# Patient Record
Sex: MALE | Race: WHITE
[De-identification: names, ages, dates, MRNs, and addresses within clinical notes are randomized per-mention and may not be internally consistent; named-entity substitution may affect disease eponyms.]

---

## 2018-08-27 ENCOUNTER — HOSPITAL ENCOUNTER (EMERGENCY)
Dept: HOSPITAL 61 - ER | Age: 63
Discharge: ANOTHER HOSPITAL | End: 2018-08-27
Payer: SELF-PAY

## 2018-08-27 VITALS — BODY MASS INDEX: 26.98 KG/M2 | WEIGHT: 158 LBS | HEIGHT: 64 IN

## 2018-08-27 VITALS — DIASTOLIC BLOOD PRESSURE: 99 MMHG | SYSTOLIC BLOOD PRESSURE: 196 MMHG

## 2018-08-27 DIAGNOSIS — R47.81: ICD-10-CM

## 2018-08-27 DIAGNOSIS — H54.62: ICD-10-CM

## 2018-08-27 DIAGNOSIS — R29.810: Primary | ICD-10-CM

## 2018-08-27 LAB
ANION GAP SERPL CALC-SCNC: 8 MMOL/L (ref 6–14)
APTT BLD: 27 SEC (ref 24–38)
BASOPHILS # BLD AUTO: 0.1 X10^3/UL (ref 0–0.2)
BASOPHILS NFR BLD: 1 % (ref 0–3)
BUN SERPL-MCNC: 17 MG/DL (ref 8–26)
CALCIUM SERPL-MCNC: 8.9 MG/DL (ref 8.5–10.1)
CHLORIDE SERPL-SCNC: 107 MMOL/L (ref 98–107)
CO2 SERPL-SCNC: 24 MMOL/L (ref 21–32)
CREAT SERPL-MCNC: 1.3 MG/DL (ref 0.7–1.3)
EOSINOPHIL NFR BLD: 0.1 X10^3/UL (ref 0–0.7)
EOSINOPHIL NFR BLD: 1 % (ref 0–3)
ERYTHROCYTE [DISTWIDTH] IN BLOOD BY AUTOMATED COUNT: 14.2 % (ref 11.5–14.5)
GFR SERPLBLD BASED ON 1.73 SQ M-ARVRAT: 56.1 ML/MIN
GLUCOSE SERPL-MCNC: 115 MG/DL (ref 70–99)
HCT VFR BLD CALC: 44.7 % (ref 39–53)
HGB BLD-MCNC: 15.1 G/DL (ref 13–17.5)
LYMPHOCYTES # BLD: 1.6 X10^3/UL (ref 1–4.8)
LYMPHOCYTES NFR BLD AUTO: 12 % (ref 24–48)
MCH RBC QN AUTO: 31 PG (ref 25–35)
MCHC RBC AUTO-ENTMCNC: 34 G/DL (ref 31–37)
MCV RBC AUTO: 92 FL (ref 79–100)
MONO #: 0.5 X10^3/UL (ref 0–1.1)
MONOCYTES NFR BLD: 4 % (ref 0–9)
NEUT #: 10.5 X10^3UL (ref 1.8–7.7)
NEUTROPHILS NFR BLD AUTO: 82 % (ref 31–73)
PLATELET # BLD AUTO: 265 X10^3/UL (ref 140–400)
POTASSIUM SERPL-SCNC: 3.7 MMOL/L (ref 3.5–5.1)
PROTHROMBIN TIME: 13 SEC (ref 11.7–14)
RBC # BLD AUTO: 4.86 X10^6/UL (ref 4.3–5.7)
SODIUM SERPL-SCNC: 139 MMOL/L (ref 136–145)
WBC # BLD AUTO: 12.8 X10^3/UL (ref 4–11)

## 2018-08-27 PROCEDURE — 84484 ASSAY OF TROPONIN QUANT: CPT

## 2018-08-27 PROCEDURE — 70498 CT ANGIOGRAPHY NECK: CPT

## 2018-08-27 PROCEDURE — 96374 THER/PROPH/DIAG INJ IV PUSH: CPT

## 2018-08-27 PROCEDURE — 80048 BASIC METABOLIC PNL TOTAL CA: CPT

## 2018-08-27 PROCEDURE — 85730 THROMBOPLASTIN TIME PARTIAL: CPT

## 2018-08-27 PROCEDURE — 70450 CT HEAD/BRAIN W/O DYE: CPT

## 2018-08-27 PROCEDURE — 93005 ELECTROCARDIOGRAM TRACING: CPT

## 2018-08-27 PROCEDURE — 99285 EMERGENCY DEPT VISIT HI MDM: CPT

## 2018-08-27 PROCEDURE — 70496 CT ANGIOGRAPHY HEAD: CPT

## 2018-08-27 PROCEDURE — 36415 COLL VENOUS BLD VENIPUNCTURE: CPT

## 2018-08-27 PROCEDURE — 85025 COMPLETE CBC W/AUTO DIFF WBC: CPT

## 2018-08-27 PROCEDURE — 85610 PROTHROMBIN TIME: CPT

## 2018-08-27 NOTE — PHYS DOC
Adult General


HPI


HPI





Patient is a 63  year old male who presents with left side facial droop and 

left side weakness.  Patient is a .  His truck was being unloaded 

and he fell asleep in the cab. He awoke about 2 hours prior to presentation 

with left-sided weakness and slurred speech he. He also had some loss of vision 

in the left visual fields in the left eye. His last known normal was around 

noon today. The patient has no stroke history. He does have prior history of 

open-heart surgery. He has multiple stents in place. He states he does take 

some sort of anticoagulant medications but does not know what they are and he 

does not have them with him. Denies chest pain. He was at baseline health prior 

to onset of symptoms today.





Review of Systems


Review of Systems





Constitutional: Denies fever or chills 


Eyes: Denies change in visual acuity


HENT: Denies nasal congestion 


Respiratory: Denies cough or shortness of breath


Cardiovascular: No additional information not addressed in HPI 


GI: Denies abdominal pain


: Denies dysuria


Musculoskeletal: Denies back pain or joint pain


Integument: Denies rash or skin lesions 


Neurologic: Denies headache


Endocrine: Denies polyuria





All other systems were reviewed and found to be within normal limits, except as 

documented in this note.





Current Medications


Current Medications





Current Medications








 Medications


  (Trade)  Dose


 Ordered  Sig/Fernando  Start Time


 Stop Time Status Last Admin


Dose Admin


 


 Aspirin


  (Aspirin)  300 mg  1X  ONCE  8/27/18 18:15


 8/27/18 19:03 DC 8/27/18 20:04


300 MG


 


 Info


  (CONTRAST GIVEN


 -- Rx MONITORING)  1 each  PRN DAILY  PRN  8/27/18 19:15


 8/29/18 19:14   





 


 Iohexol


  (Omnipaque 300


 Mg/ml)  75 ml  1X  ONCE  8/27/18 18:30


 8/27/18 19:03 DC 8/27/18 18:30


60 ML


 


 Lorazepam


  (Ativan)  0.5 mg  1X  ONCE  8/27/18 18:30


 8/27/18 19:03 DC 8/27/18 19:35


0.5 MG


 


 Ondansetron HCl


  (Zofran)  4 mg  PRN Q8HRS  PRN  8/27/18 19:30


 8/28/18 19:29   





 


 Sodium Chloride  1,000 ml @ 


 75 mls/hr  [id number]  8/27/18 19:28


 8/28/18 19:27   














Allergies


Allergies





Allergies








Coded Allergies Type Severity Reaction Last Updated Verified


 


  No Known Drug Allergies    8/27/18 No











Physical Exam


Physical Exam





Constitutional: Well developed, well nourished, no acute distress, non-toxic 

appearance


HENT: Normocephalic, atraumatic, bilateral external ears normal, oropharynx 

moist


Eyes: PERRLA, EOMI, conjunctiva normal 


Neck: Normal range of motion, no tenderness 


Cardiovascular:Heart rate regular rhythm, no murmur


Lungs & Thorax:  Bilateral breath sounds clear to auscultation


Abdomen: Bowel sounds normal, soft, no tenderness 


Skin: Warm, dry, no erythema  


Extremities: No tenderness, no edema 


Neurologic: Alert and oriented X 3, motor weakness in left upper and lower 

extremities.  Left side facial droop.  Slurred speech.  Loss of vision over 

temporal visual fields in the left eye.


Psychologic: Affect normal





Current Patient Data


Lab Values





 Laboratory Tests








Test


 8/27/18


18:10


 


White Blood Count


 12.8 x10^3/uL


(4.0-11.0)  H


 


Red Blood Count


 4.86 x10^6/uL


(4.30-5.70)


 


Hemoglobin


 15.1 g/dL


(13.0-17.5)


 


Hematocrit


 44.7 %


(39.0-53.0)


 


Mean Corpuscular Volume


 92 fL ()





 


Mean Corpuscular Hemoglobin 31 pg (25-35)  


 


Mean Corpuscular Hemoglobin


Concent 34 g/dL


(31-37)


 


Red Cell Distribution Width


 14.2 %


(11.5-14.5)


 


Platelet Count


 265 x10^3/uL


(140-400)


 


Neutrophils (%) (Auto) 82 % (31-73)  H


 


Lymphocytes (%) (Auto) 12 % (24-48)  L


 


Monocytes (%) (Auto) 4 % (0-9)  


 


Eosinophils (%) (Auto) 1 % (0-3)  


 


Basophils (%) (Auto) 1 % (0-3)  


 


Neutrophils # (Auto)


 10.5 x10^3uL


(1.8-7.7)  H


 


Lymphocytes # (Auto)


 1.6 x10^3/uL


(1.0-4.8)


 


Monocytes # (Auto)


 0.5 x10^3/uL


(0.0-1.1)


 


Eosinophils # (Auto)


 0.1 x10^3/uL


(0.0-0.7)


 


Basophils # (Auto)


 0.1 x10^3/uL


(0.0-0.2)


 


Prothrombin Time


 13.0 SEC


(11.7-14.0)


 


Prothrombin Time INR 1.0 (0.8-1.1)  


 


PTT


 27 SEC (24-38)





 


Sodium Level


 139 mmol/L


(136-145)


 


Potassium Level


 3.7 mmol/L


(3.5-5.1)


 


Chloride Level


 107 mmol/L


()


 


Carbon Dioxide Level


 24 mmol/L


(21-32)


 


Anion Gap 8 (6-14)  


 


Blood Urea Nitrogen


 17 mg/dL


(8-26)


 


Creatinine


 1.3 mg/dL


(0.7-1.3)


 


Estimated GFR


(Cockcroft-Gault) 56.1  





 


Glucose Level


 115 mg/dL


(70-99)  H


 


Calcium Level


 8.9 mg/dL


(8.5-10.1)


 


Troponin I Quantitative


 < 0.017 ng/mL


(0.000-0.055)





 Laboratory Tests


8/27/18 18:10








 Laboratory Tests


8/27/18 18:10











EKG


EKG


No STEMI


Interpretation Time:


18:20





Radiology/Procedures


Radiology/Procedures


FINDINGS:


There is mild motion artifact limiting optimal evaluation. No obvious 


evidence of pathologic extra-axial or intra-axial fluid collection. The 


ventricles and basal cisterns are within normal limits. No apparent large 


acute intracranial bleed. 6 mm focus of low attenuation in the left basal 


ganglia. There is a focus of low attenuation in the right parietal lobe 


extending from the lateral ventricle to the subcortical white matter.


 


No suspicious calvarial lesions. Visualized paranasal sinuses and mastoid 


air cells are clear. Hypoplastic right mastoid air cells.


 


IMPRESSION:


Significant motion artifact limiting optimal evaluation.


1. Subcentimeter focus of low attenuation in the left basal ganglia most 


likely lacunar infarct, age indeterminate.


2. Small area of low-attenuation in the right parietal lobe extending from


the lateral ventricle to the subcortical white matter may represent an 


infarct, age indeterminate or white matter changes from chronic 


microvascular ischemic disease. If concern for acute ischemic stroke is 


high, please consider MRI brain.





 


FINDINGS:


There is mild motion artifact limiting optimal evaluation. No obvious 


evidence of pathologic extra-axial or intra-axial fluid collection. The 


ventricles and basal cisterns are within normal limits. No apparent large 


acute intracranial bleed. 6 mm focus of low attenuation in the left basal 


ganglia. There is a focus of low attenuation in the right parietal lobe 


extending from the lateral ventricle to the subcortical white matter.


 


No suspicious calvarial lesions. Visualized paranasal sinuses and mastoid 


air cells are clear. Hypoplastic right mastoid air cells.


 


IMPRESSION:


Significant motion artifact limiting optimal evaluation.


1. Subcentimeter focus of low attenuation in the left basal ganglia most 


likely lacunar infarct, age indeterminate.


2. Small area of low-attenuation in the right parietal lobe extending from


the lateral ventricle to the subcortical white matter may represent an 


infarct, age indeterminate or white matter changes from chronic 


microvascular ischemic disease. If concern for acute ischemic stroke is 


high, please consider MRI brain.


 


Critical findings were identified on 8/27/2018 6:03 PM, read back and 


verified with Dr. Sewell on 8/27/2018 6:12 PM by Dr. Shyam Tavarez DO.





Course & Med Decision Making


Course & Med Decision Making


Pertinent Labs and Imaging studies reviewed. (See chart for details)





Patient is seen and examined immediately on arrival. The patient is unclear 

what time symptom onset occurred. He had been sleeping since noon. He awoke 

with symptoms. Additionally, the patient is on some anticoagulation type 

medications, but he cannot say what these are. He has history of prior stents 

so suspect some platelet inhibitor.  In the emergency department, the patient 

was taken immediately to CT scan. His scan was returned as documented above. 

His symptoms are on the left side.  NIH 9





18:55:  CT scan is returned with some nonspecific findings. I discussed this 

patient with the acute ischemic stroke coverage team at [facility]. They 

encouraged a CT angiogram. Patient is already in CT for that study.


19:55:  CT angiogram results are returned and documented above with a occlusion 

at the superior right him to short segment. I again discussed these findings 

with the neurologist at [facility] who did recommend transferring the 

patient for intervention.


I discussed this plan of care with the patient. The patient is agreeable to 

transfer. The appropriate paperwork is completed. EMS is contacted to provide 

transportation.  Accepting physician is Dr. Cisse.





Eren Disclaimer


Eren Disclaimer


This electronic medical record was generated, in whole or in part, using a 

voice recognition dictation system.











BRY SEWELL DO Aug 27, 2018 18:07

## 2018-08-27 NOTE — RAD
PQRS Compliance statement:

 

One or more of the following individualized dose reduction techniques were

utilized for this examination:

1. Automated exposure control.

2. Adjustment of the mA and/or kV according to patient size.

3. Use of iterative reconstruction technique.

 

Indication:LEFT SIDE FACIAL DROOP AND COMPLETE LEFT SIDE WEAKNESS

 

TECHNIQUE: CT angiogram of the head and neck with IV contrast with 

multiplanar MIP reformats. 3-D volume rendered postprocessing was 

performed. Stenosis was calculated using NASCET criteria.

 

COMPARISON: None

 

FINDINGS:

The A1, A2, A3 segments of the bilateral SHA are patent. There is focal 

nonopacification of the superior M2 segment of the right MCA.  The M1, M2,

M3 segments of the left MCA are patent.

The basilar artery is patent.

The P1, P2 and P3 segments of the bilateral PCA are patent. Bilateral 

superior cerebellar arteries are patent.  Bilateral anterior inferior 

cerebellar arteries are seen. The bilateral posterior inferior cerebellar 

arteries are patent. The bilateral vertebral arteries are patent. 

Bilateral cavernous and petrous segments of the ICA are patent. The 

bilateral extracranial ICA are patent with mild atherosclerotic disease at

the carotid bulb. There is less than 25 percent narrowing of the origin of

the left ICA. Bilateral common carotid arteries are patent. Conventional 

arch anatomy. Moderate atherosclerotic disease is seen in the origin of 

the left subclavian artery causing 50 percent narrowing. Bilateral. 

Arteries are patent. The major dural venous sinuses are patent. Bilateral 

ophthalmic arteries are patent. Orbits within normal limits. No enhancing 

brain lesion. The nasopharynx, oropharynx and hypopharynx are within 

normal limits. The submandibular glands, parotid glands and thyroid are 

within normal limits. No deep cervical adenopathy. Visualized lungs are 

clear. No suspicious bony lesion. There is complete opacification of the 

left maxillary sinus.

 

IMPRESSION:

1. Focal nonopacification of the superior M2 segment of the right MCA 

concerning for occlusion. Clinically correlate with symptoms.

 

Critical findings were identified on 8/27/2018 7:22 PM, read back and 

verified with Dr. Sewell on 8/27/2018 7:43 PM by Dr. Shyam Tavarez DO. 

 

Electronically signed by: Shyam Tavarez DO (8/27/2018 7:43 PM) [facility]

## 2018-08-27 NOTE — RAD
PQRS Compliance statement:

 

One or more of the following individualized dose reduction techniques were

utilized for this examination:

1. Automated exposure control.

2. Adjustment of the mA and/or kV according to patient size.

3. Use of iterative reconstruction technique.

 

Indication:CODE STROKE, left SIDE WEAKNESS, FACIAL DROOP. NO PREVIOUS

 

TECHNIQUE: CT head without IV contrast

 

COMPARISON:None

 

FINDINGS:

There is mild motion artifact limiting optimal evaluation. No obvious 

evidence of pathologic extra-axial or intra-axial fluid collection. The 

ventricles and basal cisterns are within normal limits. No apparent large 

acute intracranial bleed. 6 mm focus of low attenuation in the left basal 

ganglia. There is a focus of low attenuation in the right parietal lobe 

extending from the lateral ventricle to the subcortical white matter.

 

No suspicious calvarial lesions. Visualized paranasal sinuses and mastoid 

air cells are clear. Hypoplastic right mastoid air cells.

 

IMPRESSION:

Significant motion artifact limiting optimal evaluation.

1. Subcentimeter focus of low attenuation in the left basal ganglia most 

likely lacunar infarct, age indeterminate.

2. Small area of low-attenuation in the right parietal lobe extending from

the lateral ventricle to the subcortical white matter may represent an 

infarct, age indeterminate or white matter changes from chronic 

microvascular ischemic disease. If concern for acute ischemic stroke is 

high, please consider MRI brain.

 

Critical findings were identified on 8/27/2018 6:03 PM, read back and 

verified with Dr. Sewell on 8/27/2018 6:12 PM by Dr. Shyam Tavarez DO.

 

Electronically signed by: Shyam Tavarez DO (8/27/2018 6:12 PM) [facility]

## 2018-08-27 NOTE — HP
ADMIT DATE:  08/27/2018



CHIEF COMPLAINT:  Mental status change, left facial droop, left-sided weakness.



HISTORY OF PRESENT ILLNESS:  The patient is a pleasant 63-year-old male who

presented to the ER with above chief complaints.  Basically, for the past day or

so he has had left-sided weakness.  I think he is a .  He is really

not sure what time this all started.  He fell asleep in his cab earlier, awoke 2

hours prior to the arrival to the ER and has these symptoms, left-sided

weakness, left facial droop and some slurred speech.  He seems to have some

mental status change, he is also impulsive.  He has got some visual field

deficits.  We checked a CAT scan in the ER.  There is an old basal ganglia

stroke on the left, but there is also a possible new right parietal stroke.  We

are going to admit the patient and consult Neurology.



PAST MEDICAL HISTORY:  None reported.



ALLERGIES:  None reported.



FAMILY HISTORY:  Diabetes.



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  No drugs or smoking.



MEDICATIONS:  Reviewed, please refer to the MRAD.



REVIEW OF SYSTEMS:  

GENERAL:  No history of weight change, weakness or fevers.

SKIN:  No bruising, hair changes or rashes.

EYES:  No blurred, double or loss of vision.

NOSE AND THROAT:  No history of nosebleeds, hoarseness or sore throat.

HEART:  No history of palpitations, chest pain or shortness of breath on

exertion.

LUNGS:  Denies cough, hemoptysis, wheezing or shortness of breath.

GASTROINTESTINAL:  Denies changes in appetite, nausea, vomiting, diarrhea or

constipation.

GENITOURINARY:  No history of frequency, urgency, hesitancy or nocturia.

NEUROLOGIC:  He complains of left-sided weakness and left facial drooping and

slurred speech.

PSYCHIATRIC:  No history of panic, anxiety or depression.

ENDOCRINE:  No history of heat or cold intolerance, polyuria or polydipsia.

EXTREMITIES:  Denies muscle weakness, joint pain, pain on walking or stiffness.



PHYSICAL EXAMINATION:

VITAL SIGNS:  Temperature afebrile, pulse 92, respirations 18, blood pressure

142/90.

GENERAL:  He is in the ER room 3, seems to be impulsive.  He asked me to give

his glasses, I did.

HEART:  Distant S1, S2.

LUNGS:  Mostly clear.

ABDOMEN:  Soft.

EXTREMITIES:  Trace edema.

SKIN:  No rashes.

ENDOCRINE:  No thyromegaly.

LYMPHATICS:  No cervical nodes.

HEMATOPOIETIC:  No bruising.

NEUROLOGIC:  He has got left facial droop and left-sided weakness.  He is also

impulsive.



LABORATORY DATA:  Hematology is normal.  CT of the head shows the

above-mentioned old basal ganglia stroke and a right parietal stroke.



ASSESSMENT AND PLAN:  Stroke.  The patient is being admitted.  We will consult

Neurology.  Cardiac monitoring, daily aspirin.  PT, OT, speech therapy.  Resume

home medicines, frequent labs.



PROGNOSIS:  Guarded.

 



______________________________

SUSAN MARIA DO



DR:  LORENZO/ivette  JOB#:  [id number] / [id number]

DD:  08/27/2018 19:44  DT:  08/27/2018 20:29

## 2018-08-28 NOTE — EKG
[facility]

              [address] [place] [address]

Test Date:    2018               Test Time:    18:17:22

Pat Name:     ROCÍO TORRES            Department:   

Patient ID:   [id number]           Room:          

Gender:       M                        Technician:   

:          [id number]               Requested By: BRY WALLER

Order Number: [id number]           Reading MD:   Richard Aggarwal MD

                                 Measurements

Intervals                              Axis          

Rate:         63                       P:            48

DC:           136                      QRS:          -26

QRSD:         106                      T:            -34

QT:           434                                    

QTc:          447                                    

                           Interpretive Statements

SINUS RHYTHM

LEFTWARD AXIS

QRS(T) CONTOUR ABNORMALITY

CONSISTENT WITH INFERIOR INFARCT

AGE UNDETERMINED



Electronically Signed On 2018 11:30:18 CDT by Richard Aggarwal MD